# Patient Record
Sex: FEMALE | Race: WHITE | Employment: FULL TIME | URBAN - METROPOLITAN AREA
[De-identification: names, ages, dates, MRNs, and addresses within clinical notes are randomized per-mention and may not be internally consistent; named-entity substitution may affect disease eponyms.]

---

## 2017-12-28 ENCOUNTER — APPOINTMENT (OUTPATIENT)
Dept: GENERAL RADIOLOGY | Age: 47
End: 2017-12-28
Attending: EMERGENCY MEDICINE
Payer: COMMERCIAL

## 2017-12-28 ENCOUNTER — HOSPITAL ENCOUNTER (EMERGENCY)
Age: 47
Discharge: HOME | End: 2017-12-28
Attending: EMERGENCY MEDICINE
Payer: COMMERCIAL

## 2017-12-28 VITALS
OXYGEN SATURATION: 96 % | TEMPERATURE: 99 F | HEART RATE: 101 BPM | HEIGHT: 66 IN | WEIGHT: 206.79 LBS | BODY MASS INDEX: 33.23 KG/M2 | DIASTOLIC BLOOD PRESSURE: 74 MMHG | RESPIRATION RATE: 18 BRPM | SYSTOLIC BLOOD PRESSURE: 116 MMHG

## 2017-12-28 DIAGNOSIS — J06.9 ACUTE UPPER RESPIRATORY INFECTION: Primary | ICD-10-CM

## 2017-12-28 LAB
FLUAV AG NPH QL IA: NEGATIVE
FLUBV AG NOSE QL IA: NEGATIVE

## 2017-12-28 PROCEDURE — 71020 XR CHEST PA LAT: CPT

## 2017-12-28 PROCEDURE — 87804 INFLUENZA ASSAY W/OPTIC: CPT | Performed by: EMERGENCY MEDICINE

## 2017-12-28 PROCEDURE — 99283 EMERGENCY DEPT VISIT LOW MDM: CPT

## 2017-12-28 RX ORDER — AZITHROMYCIN 250 MG/1
TABLET, FILM COATED ORAL
Qty: 6 TAB | Refills: 0 | Status: SHIPPED | OUTPATIENT
Start: 2017-12-28

## 2017-12-28 RX ORDER — HYDROCODONE BITARTRATE AND HOMATROPINE METHYLBROMIDE 1.5; 5 MG/5ML; MG/5ML
5 SYRUP ORAL
Qty: 100 ML | Refills: 0 | Status: SHIPPED | OUTPATIENT
Start: 2017-12-28

## 2017-12-28 NOTE — ED NOTES
Dr. Jocelyn Cuellar reviewed discharge instructions with the patient. The patient verbalized understanding. All questions and concerns were addressed. The patient declined a wheelchair and is discharged ambulatory in the care of family members with instructions and prescriptions in hand. Pt is alert and oriented x 4. Respirations are clear and unlabored.

## 2017-12-28 NOTE — ED PROVIDER NOTES
EMERGENCY DEPARTMENT HISTORY AND PHYSICAL EXAM      Date: 12/28/2017  Patient Name: Savana Ordonez    History of Presenting Illness     Chief Complaint   Patient presents with    Generalized Body Aches     x 2 days with productive cough x 2 days    Fever     pt reports fever of 101 last night, pt last took tylenol at 0600 today     Chills     onset last night     History Provided By: Patient    HPI: Savana Ordonez, 49 y.o. female with PMHx significant for asthma, DM, and breast cancer, presents ambulatory with family to the ED with cc of gradual onset, progressively worsening productive cough with congestion and rhinorrhea that began on 12/26/2017. Pt is brining up clear mucus with her cough. She notes mild chest pain but believes it to be from her cough. Pt was febrile last night at 101 F. Her family has had a URI for ~1 week. 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 She received her flu shot. She notes that she finished tx for breast cancer in 5/2017. Pt specifically denies SOB. PCP: None     Social Hx: former Tobacco, - EtOH, - Illicit Drugs    There are no other complaints, changes, or physical findings at this time. Current Outpatient Prescriptions   Medication Sig Dispense Refill    CANAGLIFLOZIN (INVOKANA PO) Take  by mouth.  azithromycin (ZITHROMAX Z-CHILO) 250 mg tablet Take as directed 6 Tab 0    HYDROcodone-homatropine (HYCODAN) 5-1.5 mg/5 mL (5 mL) syrup Take 5 mL by mouth three (3) times daily as needed. Max Daily Amount: 15 mL. Indications: Cough 100 mL 0    amitriptyline (ELAVIL) 75 mg tablet Take 75 mg by mouth nightly.        Past History     Past Medical History:  Past Medical History:   Diagnosis Date    Asthma     Cancer ([address]     L breast cancer    Diabetes ([facility] [address]      Past Surgical History:  Past Surgical History:   Procedure Laterality Date    BREAST SURGERY PROCEDURE UNLISTED      L breast lumpectomy     HX CHOLECYSTECTOMY      HX ORTHOPAEDIC      left knee      Family History:  History reviewed. No pertinent family history. Social History:  Social History   Substance Use Topics    Smoking status: Former Smoker    Smokeless tobacco: Never Used    Alcohol use No     Allergies: Allergies   Allergen Reactions    Demerol [Meperidine] Other (comments)     Respiratory    Other Medication Other (comments)     emerol- respiratory    Sulfa (Sulfonamide Antibiotics) Nausea and Vomiting    Zofran [Ondansetron Hcl (Pf)] Other (comments)     Allergic reaction with red up arm     Review of Systems   Review of Systems   Constitutional: Negative for fatigue and fever. HENT: Positive for congestion and rhinorrhea. Eyes: Negative. Respiratory: Positive for cough (productive). Negative for shortness of breath and wheezing. Cardiovascular: Positive for chest pain (mild). Negative for leg swelling. Gastrointestinal: Negative for blood in stool, constipation, diarrhea, nausea and vomiting. Endocrine: Negative. Genitourinary: Negative for difficulty urinating and dysuria. Musculoskeletal: Negative. Skin: Negative for rash. Allergic/Immunologic: Negative. Neurological: Negative for weakness and numbness. Hematological: Negative. Psychiatric/Behavioral: Negative. Physical Exam   Physical Exam   Constitutional: She is oriented to person, place, and time. She appears well-developed and well-nourished. HENT:   Head: Normocephalic and atraumatic. Mouth/Throat: Oropharynx is clear and moist and mucous membranes are normal.   Eyes: EOM are normal. Pupils are equal, round, and reactive to light. Neck: Normal range of motion. No JVD present. No tracheal deviation present. Cardiovascular: Normal rate, regular rhythm, normal heart sounds and intact distal pulses. Exam reveals no gallop and no friction rub. No murmur heard. Pulmonary/Chest: Effort normal and breath sounds normal. No stridor. She has no wheezes. She has no rales.    Lung sounds clear Abdominal: Soft. Bowel sounds are normal. She exhibits no distension and no mass. There is no tenderness. There is no guarding. Musculoskeletal: Normal range of motion. She exhibits no edema or tenderness. Neurological: She is alert and oriented to person, place, and time. Skin: Skin is warm and dry. No rash noted. Psychiatric: She has a normal mood and affect. Her behavior is normal. Judgment and thought content normal.     Diagnostic Study Results     Labs -     Recent Results (from the past 12 hour(s))   INFLUENZA A & B AG (RAPID TEST)    Collection Time: 12/28/17 12:35 PM   Result Value Ref Range    Influenza A Antigen NEGATIVE  NEG      Influenza B Antigen NEGATIVE  NEG         Radiologic Studies -     CXR Results  (Last 48 hours)               12/28/17 1225  XR CHEST PA LAT Final result    Impression:  Impression: No acute process. Narrative:  Exam:  2 view chest       Indication: Fever, generalized body aches, chills, productive cough for 2 days       PA and lateral views demonstrate normal heart size. There is no acute process in   the lung fields. The osseous structures are unremarkable. Postoperative changes   are noted in the left chest wall. Medical Decision Making   I am the first provider for this patient. I reviewed the vital signs, available nursing notes, past medical history, past surgical history, family history and social history. Vital Signs-Reviewed the patient's vital signs. Patient Vitals for the past 12 hrs:   Temp Pulse Resp BP SpO2   12/28/17 1215 - - - 108/64 98 %   12/28/17 1200 - - - 112/78 100 %   12/28/17 1158 99 °F (37.2 °C) (!) 101 18 133/77 100 %     Pulse Oximetry Analysis - 99% on RA    Records Reviewed: Nursing Notes    Provider Notes (Medical Decision Making):   Pt presenting with URI symptoms, had fever last night but is afebrile at this time. Pt is not currently actively getting treatment for her breast cancer.  DDx includes uri, influenza, pneumonia, bronchitis. Will check rapid flu and CXR. ED Course:   Initial assessment performed. The patients presenting problems have been discussed, and they are in agreement with the care plan formulated and outlined with them. I have encouraged them to ask questions as they arise throughout their visit. 1:05 PM  Pt is flu negative. Pt remains non-toxic appearing, and VSS. No suspicion for sepsis. Provided patient with a Rx for Z-pack to take if symptoms get worse or patient's fevers persist.     1:20 PM  Pt is requesting cough medication. Will write a Rx for Hycodan. Disposition:  Discharge Note:  1:14 PM  The patient has been re-evaluated and is ready for discharge. Reviewed available results with patient. Counseled patient/parent/guardian on diagnosis and care plan. Patient has expressed understanding, and all questions have been answered. Patient agrees with plan and agrees to follow up as recommended, or return to the ED if their symptoms worsen. Discharge instructions have been provided and explained to the patient, along with reasons to return to the ED. PLAN:  1. Current Discharge Medication List      START taking these medications    Details   azithromycin (ZITHROMAX Z-CHILO) 250 mg tablet Take as directed  Qty: 6 Tab, Refills: 0      HYDROcodone-homatropine (HYCODAN) 5-1.5 mg/5 mL (5 mL) syrup Take 5 mL by mouth three (3) times daily as needed. Max Daily Amount: 15 mL. Indications: Cough  Qty: 100 mL, Refills: 0    Associated Diagnoses: Acute upper respiratory infection           2. Follow-up Information     Follow up With Details Comments Contact Info    Your PCP Schedule an appointment as soon as possible for a visit          Return to ED if worse     Diagnosis     Clinical Impression:   1. Acute upper respiratory infection      Attestations:    Attestation:   This note is prepared by Joy Mayberry, acting as Scribe for Nick Canela, DO Nick Canela, DO: The scribe's documentation has been prepared under my direction and personally reviewed by me in its entirety. I confirm that the note above accurately reflects all work, treatment, procedures, and medical decision making performed by me.

## 2017-12-28 NOTE — ED NOTES
Assumed care of pt from triage. Pt c/o chills, generalized body aches, and productive cough with yellow sputum x 2 days. Pt reports fever of 101 yesterday and states onset of chills yesterday. Pt last took tylenol at 0600 this morning. Pt placed on monitor x2. VSS. Pt in position of comfort with call bell within reach and family at bedside.